# Patient Record
Sex: MALE | Race: WHITE | Employment: UNEMPLOYED
[De-identification: names, ages, dates, MRNs, and addresses within clinical notes are randomized per-mention and may not be internally consistent; named-entity substitution may affect disease eponyms.]

---

## 2017-03-03 ENCOUNTER — OFFICE VISIT (OUTPATIENT)
Dept: FAMILY MEDICINE CLINIC | Facility: CLINIC | Age: 22
End: 2017-03-03

## 2017-03-03 VITALS
WEIGHT: 215 LBS | TEMPERATURE: 97.4 F | BODY MASS INDEX: 30.78 KG/M2 | RESPIRATION RATE: 26 BRPM | DIASTOLIC BLOOD PRESSURE: 81 MMHG | SYSTOLIC BLOOD PRESSURE: 129 MMHG | HEIGHT: 70 IN | OXYGEN SATURATION: 94 % | HEART RATE: 73 BPM

## 2017-03-03 DIAGNOSIS — F90.2 ATTENTION DEFICIT HYPERACTIVITY DISORDER (ADHD), COMBINED TYPE: ICD-10-CM

## 2017-03-03 PROCEDURE — 99213 OFFICE O/P EST LOW 20 MIN: CPT | Performed by: NURSE PRACTITIONER

## 2017-06-01 ENCOUNTER — OFFICE VISIT (OUTPATIENT)
Dept: FAMILY MEDICINE CLINIC | Age: 22
End: 2017-06-01
Payer: MEDICARE

## 2017-06-01 VITALS
HEIGHT: 71 IN | OXYGEN SATURATION: 94 % | WEIGHT: 211.6 LBS | TEMPERATURE: 97.5 F | SYSTOLIC BLOOD PRESSURE: 123 MMHG | HEART RATE: 59 BPM | BODY MASS INDEX: 29.62 KG/M2 | DIASTOLIC BLOOD PRESSURE: 70 MMHG

## 2017-06-01 DIAGNOSIS — Z13.31 POSITIVE DEPRESSION SCREENING: Primary | ICD-10-CM

## 2017-06-01 DIAGNOSIS — F90.2 ATTENTION DEFICIT HYPERACTIVITY DISORDER (ADHD), COMBINED TYPE: ICD-10-CM

## 2017-06-01 PROCEDURE — 99213 OFFICE O/P EST LOW 20 MIN: CPT | Performed by: NURSE PRACTITIONER

## 2017-06-01 PROCEDURE — G8431 POS CLIN DEPRES SCRN F/U DOC: HCPCS | Performed by: NURSE PRACTITIONER

## 2017-06-01 ASSESSMENT — PATIENT HEALTH QUESTIONNAIRE - PHQ9
7. TROUBLE CONCENTRATING ON THINGS, SUCH AS READING THE NEWSPAPER OR WATCHING TELEVISION: 0
SUM OF ALL RESPONSES TO PHQ QUESTIONS 1-9: 8
6. FEELING BAD ABOUT YOURSELF - OR THAT YOU ARE A FAILURE OR HAVE LET YOURSELF OR YOUR FAMILY DOWN: 0
8. MOVING OR SPEAKING SO SLOWLY THAT OTHER PEOPLE COULD HAVE NOTICED. OR THE OPPOSITE, BEING SO FIGETY OR RESTLESS THAT YOU HAVE BEEN MOVING AROUND A LOT MORE THAN USUAL: 0
3. TROUBLE FALLING OR STAYING ASLEEP: 1
10. IF YOU CHECKED OFF ANY PROBLEMS, HOW DIFFICULT HAVE THESE PROBLEMS MADE IT FOR YOU TO DO YOUR WORK, TAKE CARE OF THINGS AT HOME, OR GET ALONG WITH OTHER PEOPLE: 1
1. LITTLE INTEREST OR PLEASURE IN DOING THINGS: 3
4. FEELING TIRED OR HAVING LITTLE ENERGY: 0
9. THOUGHTS THAT YOU WOULD BE BETTER OFF DEAD, OR OF HURTING YOURSELF: 0
SUM OF ALL RESPONSES TO PHQ9 QUESTIONS 1 & 2: 6
2. FEELING DOWN, DEPRESSED OR HOPELESS: 3
5. POOR APPETITE OR OVEREATING: 1

## 2017-09-12 ENCOUNTER — OFFICE VISIT (OUTPATIENT)
Dept: FAMILY MEDICINE CLINIC | Age: 22
End: 2017-09-12
Payer: MEDICARE

## 2017-09-12 VITALS
OXYGEN SATURATION: 98 % | TEMPERATURE: 97 F | RESPIRATION RATE: 22 BRPM | HEIGHT: 71 IN | WEIGHT: 211 LBS | SYSTOLIC BLOOD PRESSURE: 126 MMHG | HEART RATE: 95 BPM | BODY MASS INDEX: 29.54 KG/M2 | DIASTOLIC BLOOD PRESSURE: 70 MMHG

## 2017-09-12 DIAGNOSIS — F90.2 ATTENTION DEFICIT HYPERACTIVITY DISORDER (ADHD), COMBINED TYPE: ICD-10-CM

## 2017-09-12 PROCEDURE — 99213 OFFICE O/P EST LOW 20 MIN: CPT | Performed by: NURSE PRACTITIONER

## 2017-12-11 ENCOUNTER — HOSPITAL ENCOUNTER (OUTPATIENT)
Age: 22
Setting detail: SPECIMEN
Discharge: HOME | End: 2017-12-11
Payer: MEDICARE

## 2017-12-11 ENCOUNTER — OFFICE VISIT (OUTPATIENT)
Dept: FAMILY MEDICINE CLINIC | Age: 22
End: 2017-12-11
Payer: MEDICARE

## 2017-12-11 VITALS
BODY MASS INDEX: 30.27 KG/M2 | WEIGHT: 216.2 LBS | DIASTOLIC BLOOD PRESSURE: 76 MMHG | TEMPERATURE: 98.7 F | OXYGEN SATURATION: 98 % | SYSTOLIC BLOOD PRESSURE: 94 MMHG | HEIGHT: 71 IN | HEART RATE: 90 BPM | RESPIRATION RATE: 16 BRPM

## 2017-12-11 DIAGNOSIS — F90.2 ATTENTION DEFICIT HYPERACTIVITY DISORDER (ADHD), COMBINED TYPE: Primary | ICD-10-CM

## 2017-12-11 DIAGNOSIS — Z91.89 COMPLIANCE WITH MEDICATION REGIMEN: ICD-10-CM

## 2017-12-11 LAB
AMPHETAMINE SCREEN URINE: POSITIVE
AMPHETAMINE SCREEN, URINE: ABNORMAL
BARBITURATE SCREEN URINE: NEGATIVE
BARBITURATE SCREEN, URINE: ABNORMAL
BENZODIAZEPINE SCREEN, URINE: ABNORMAL
BENZODIAZEPINE SCREEN, URINE: NEGATIVE
BUPRENORPHINE URINE: ABNORMAL
CANNABINOID SCREEN URINE: POSITIVE
COCAINE METABOLITE SCREEN URINE: ABNORMAL
COCAINE METABOLITE, URINE: NEGATIVE
MDMA URINE: ABNORMAL
MDMA URINE: ABNORMAL
METHADONE SCREEN, URINE: ABNORMAL
METHADONE SCREEN, URINE: NEGATIVE
METHAMPHETAMINE, URINE: ABNORMAL
METHAMPHETAMINE, URINE: ABNORMAL
OPIATE SCREEN URINE: ABNORMAL
OPIATES, URINE: NEGATIVE
OXYCODONE SCREEN URINE: ABNORMAL
OXYCODONE SCREEN URINE: NEGATIVE
PHENCYCLIDINE SCREEN URINE: ABNORMAL
PHENCYCLIDINE, URINE: NEGATIVE
PROPOXYPHENE SCREEN, URINE: ABNORMAL
PROPOXYPHENE, URINE: ABNORMAL
TEST INFORMATION: ABNORMAL
THC: ABNORMAL
TRICYCLIC ANTIDEPRESSANTS, UR: ABNORMAL
TRICYCLIC ANTIDEPRESSANTS, UR: ABNORMAL

## 2017-12-11 PROCEDURE — G8417 CALC BMI ABV UP PARAM F/U: HCPCS | Performed by: NURSE PRACTITIONER

## 2017-12-11 PROCEDURE — 99213 OFFICE O/P EST LOW 20 MIN: CPT | Performed by: NURSE PRACTITIONER

## 2017-12-11 PROCEDURE — 4004F PT TOBACCO SCREEN RCVD TLK: CPT | Performed by: NURSE PRACTITIONER

## 2017-12-11 PROCEDURE — 80305 DRUG TEST PRSMV DIR OPT OBS: CPT | Performed by: NURSE PRACTITIONER

## 2017-12-11 PROCEDURE — G8484 FLU IMMUNIZE NO ADMIN: HCPCS | Performed by: NURSE PRACTITIONER

## 2017-12-11 PROCEDURE — G8427 DOCREV CUR MEDS BY ELIG CLIN: HCPCS | Performed by: NURSE PRACTITIONER

## 2017-12-11 NOTE — PROGRESS NOTES
Patient is for a check up/ med check. He asked to do a urine screen. No other concerns at this time. Visit Information    Have you changed or started any medications since your last visit including any over-the-counter medicines, vitamins, or herbal medicines? no   Have you stopped taking any of your medications? Is so, why? -  no  Are you having any side effects from any of your medications? - no    Have you seen any other physician or provider since your last visit?  no   Have you had any other diagnostic tests since your last visit?  no   Have you been seen in the emergency room and/or had an admission in a hospital since we last saw you?  no   Have you had your routine dental cleaning in the past 6 months?  no     Do you have an active MyChart account? If no, what is the barrier?   No: declined    Patient Care Team:  Bay Frausto CNP as PCP - General (Certified Nurse Practitioner)    Medical History Review  Past Medical, Family, and Social History reviewed and does not contribute to the patient presenting condition    Health Maintenance   Topic Date Due    HIV screen  10/24/2012    Meningococcal (MCV) Vaccine Age 0-22 Years (2 of 2) 10/24/2013    Pneumococcal med risk (1 of 1 - PPSV23) 10/24/2016    Flu vaccine (1) 09/01/2017    DTaP/Tdap/Td vaccine (7 - Td) 08/14/2019

## 2017-12-11 NOTE — PROGRESS NOTES
ADHD FOLLOW-UP    [unfilled] is a @AGE@ [id number] who presents for ADHD follow-up. HPI:  Changes since last visit:    weight has unchanged by 0 lbs      Concerns? none    Current ADHD medication(s)? vyvanse    Happy with how medication is working? Yes    Medications that have been tried previously? unknown    Reason for stopping previous medication? unknwon     Side Effects:   Decrease in appetite? No   Insomnia? No   Noticable increase in tics? No   New problems with headaches? No   Ataxia? No   Increase in aggression? No    Increase in depression? No   Chest pain? No    Attention:    Day dreaming? No   Able to focus? Yes   Hyperactive? No   Impulsive? No    Tasking:    Able to initiate tasks? Yes    Able to complete tasks? Yes    Procrastinates? No   Tends to start everything and finish nothing? No    School Performance:    Failing? na   Struggling? na   Teachers are very involved? na   Has IEP or 504 plan? na    Family:    New stressors? No   New input from psychologist? No    He is not working right now, got into an argument with boss and got let go, he states the boss is calling him back. Urine checked today and positive for thc. Pt reports smoked marijuana yesterday and pretty regularly. We discussed at last visit if he was to get a controlled substance like vyvanse than he can't be using illegal drugs, verbalized understanding. Will not refill script today, discussed that he will need to stop smoking marijuana. Review of Systems   Constitutional: no significant weight loss or gain and no fatigue or fever. Eyes: no itching or eye drainage. Ears: no drainage/discharge, difficulty hearing, or ear pain. Nose: no rhinorrhea, frequent nosebleeds, nasal congestion, or sinus pressure. Mouth/Throat: no sore throat, mouth ulcers, or difficulty swallowing. Neurologic: no headaches. Cardiovascular: no chest pain.    Respiratory: no wheezing, shortness of breath, sputum production, or sleep apnea and

## 2017-12-13 ENCOUNTER — TELEPHONE (OUTPATIENT)
Dept: FAMILY MEDICINE CLINIC | Age: 22
End: 2017-12-13

## 2017-12-14 ENCOUNTER — TELEPHONE (OUTPATIENT)
Dept: FAMILY MEDICINE CLINIC | Age: 22
End: 2017-12-14